# Patient Record
Sex: FEMALE | Race: WHITE | NOT HISPANIC OR LATINO | URBAN - NONMETROPOLITAN AREA
[De-identification: names, ages, dates, MRNs, and addresses within clinical notes are randomized per-mention and may not be internally consistent; named-entity substitution may affect disease eponyms.]

---

## 2021-03-12 ENCOUNTER — IMPORTED ENCOUNTER (OUTPATIENT)
Dept: URBAN - NONMETROPOLITAN AREA CLINIC 1 | Facility: CLINIC | Age: 41
End: 2021-03-12

## 2021-03-12 PROBLEM — Z01.00: Noted: 2021-03-12

## 2021-03-12 PROCEDURE — S0621 ROUTINE OPHTHALMOLOGICAL EXA: HCPCS

## 2021-03-12 NOTE — PATIENT DISCUSSION
Clinical Emmetropia-  discussed findings w/patient-  no Rx issued-  continue to monitor yearly or prn; 's Notes: MR 3/15/2021DFE 3/15/2021

## 2022-04-15 ASSESSMENT — TONOMETRY
OD_IOP_MMHG: 16
OS_IOP_MMHG: 16

## 2022-04-15 ASSESSMENT — VISUAL ACUITY
OD_CC: 20/20
OS_CC: 20/20
OU_SC: 20/20

## 2024-05-03 ENCOUNTER — ESTABLISHED PATIENT (OUTPATIENT)
Dept: URBAN - NONMETROPOLITAN AREA CLINIC 4 | Facility: CLINIC | Age: 44
End: 2024-05-03

## 2024-05-03 DIAGNOSIS — H52.13: ICD-10-CM

## 2024-05-03 PROCEDURE — S0621AEC ROUTINE OPH EXAM INCLUDES REF/ EST PATIENT

## 2024-05-03 ASSESSMENT — TONOMETRY
OD_IOP_MMHG: 16
OS_IOP_MMHG: 16

## 2024-05-03 ASSESSMENT — VISUAL ACUITY
OS_SC: 20/20
OD_SC: 20/20

## 2025-08-28 ENCOUNTER — COMPREHENSIVE EXAM (OUTPATIENT)
Age: 45
End: 2025-08-28

## 2025-08-28 DIAGNOSIS — H52.13: ICD-10-CM

## 2025-08-28 DIAGNOSIS — H04.123: ICD-10-CM

## 2025-08-28 PROCEDURE — 92014 COMPRE OPH EXAM EST PT 1/>: CPT
